# Patient Record
Sex: FEMALE | Race: WHITE | Employment: FULL TIME | URBAN - METROPOLITAN AREA
[De-identification: names, ages, dates, MRNs, and addresses within clinical notes are randomized per-mention and may not be internally consistent; named-entity substitution may affect disease eponyms.]

---

## 2024-06-06 ENCOUNTER — OFFICE VISIT (OUTPATIENT)
Dept: NEUROLOGY | Age: 24
End: 2024-06-06
Payer: COMMERCIAL

## 2024-06-06 DIAGNOSIS — G47.9 SLEEP DISORDER: ICD-10-CM

## 2024-06-06 DIAGNOSIS — R56.9 SEIZURE (HCC): Primary | ICD-10-CM

## 2024-06-06 PROCEDURE — 99204 OFFICE O/P NEW MOD 45 MIN: CPT | Performed by: PSYCHIATRY & NEUROLOGY

## 2024-06-06 RX ORDER — DIAZEPAM 10 MG/1
TABLET ORAL
Qty: 1 TABLET | Refills: 0 | Status: SHIPPED | OUTPATIENT
Start: 2024-06-06 | End: 2024-08-01

## 2024-06-06 RX ORDER — LEVETIRACETAM 500 MG/1
500 TABLET ORAL 2 TIMES DAILY
Qty: 180 TABLET | Refills: 3 | Status: SHIPPED | OUTPATIENT
Start: 2024-06-06

## 2024-06-06 ASSESSMENT — ENCOUNTER SYMPTOMS
EYES NEGATIVE: 1
ALLERGIC/IMMUNOLOGIC NEGATIVE: 1
RESPIRATORY NEGATIVE: 1
GASTROINTESTINAL NEGATIVE: 1

## 2024-06-06 NOTE — PROGRESS NOTES
[facility] NEUROLOGY  [address], [address]  [place] [address]  Phone: [phone] Fax [phone]  Dr. Vladimir Ray      6/6/2024  Mattie Mckeon     Patient is referred by the following provider for consultation regarding as below:       I reviewed the available records and notes and have examined patient with the following findings:     Chief Complaint:  No chief complaint on file.         HPI: This is a right handed 23 y.o.  female here with her .  In 2020 the patient had her first episode where she woke up in the morning her left arm was charley horse René up to the point where it suzan into her body this was again going from sleep to wake and it progressively quickly got worse to the point where she actually arched over the bed and had what sounds like a tonic seizure.  Her mom and finder took to the emergency room the hospital felt like there is a good chance it might be a seizure I referred her to cardiology.  I believe she had EEGs as well workup which was negative.  Her second episode occurred in the next year as her wedding day July 2021 7 in the morning left arm charley horse going from transition of sleep to awake arched rate off the bed.  Went to the emergency room again they said this was maybe another seizure.  In 2022 within the next year she had another episode she did not go to the hospital.  The following year in 2023 she had another identical episode transitioning from sleep to wake went to the emergency room they actually started her on Keppra 500 mg twice a day she saw [facility] who again was not sure but it sounded like seizure activity.  She did well for about 7 months and then they weaned her off of it about 6 months after that she had another event.  She did inform the neurologist that they were not sure what was going they did an 1 hour EEG her primary care did an EEG because a neurologist just was not going to do much at that point.  Then 2 weeks

## 2024-06-10 ENCOUNTER — CLINICAL DOCUMENTATION (OUTPATIENT)
Dept: NEUROLOGY | Age: 24
End: 2024-06-10

## 2024-06-10 NOTE — PROGRESS NOTES
I did receive a great deal of blood studies from this patient that were done on 6-3-2024 her white count is in the normal range.  Her hemoglobin A1c was 5.2 which was normal.  TSH was normal B12 was 423 vitamin D was normal iron was normal her chlamydia test was normal

## 2024-09-17 ENCOUNTER — CLINICAL DOCUMENTATION (OUTPATIENT)
Dept: NEUROLOGY | Age: 24
End: 2024-09-17

## 2024-11-13 ENCOUNTER — TELEMEDICINE (OUTPATIENT)
Dept: NEUROLOGY | Age: 24
End: 2024-11-13
Payer: COMMERCIAL

## 2024-11-13 DIAGNOSIS — R56.9 NOCTURNAL SEIZURES (HCC): Primary | ICD-10-CM

## 2024-11-13 PROCEDURE — 99214 OFFICE O/P EST MOD 30 MIN: CPT | Performed by: PSYCHIATRY & NEUROLOGY

## 2024-11-13 RX ORDER — LEVETIRACETAM 500 MG/1
TABLET ORAL
Qty: 270 TABLET | Refills: 3 | Status: SHIPPED | OUTPATIENT
Start: 2024-11-13

## 2024-11-13 ASSESSMENT — ENCOUNTER SYMPTOMS
ALLERGIC/IMMUNOLOGIC NEGATIVE: 1
EYES NEGATIVE: 1
RESPIRATORY NEGATIVE: 1
GASTROINTESTINAL NEGATIVE: 1

## 2024-11-13 NOTE — PROGRESS NOTES
[facility] NEUROLOGY  [address] [place] [address], [address]  [place] [address]  Phone: [phone] Fax [phone]  Dr. Vladimir Ray      I was in the office While conducting this encounter.    She and/ or her healthcare decision maker is aware that this patient-initialed Telehealth encounter is a billable service with coverage as determined by her insurance carrier.  She is aware that she may receive a bill and has provided verbal consent to proceed: Yes    The Virtual visit was conducted via [employer]. Pursuant to the emergency declaration under the Shay Act and the National Emergencies Act, 1135 waiver authority and the Coronavirus Preparedness and Response Supplemental Appropriations Act, this Virtual  Visit was conducted to reduce the patient's risk of exposure to COVID-19 and provide continuity of care for an established patient. Services were provided through a video synchronous discussion virtually to substitute for in-person clinic visit.  Due to this being a TeleHealth evaluation, many elements of the physical examination are unable to be assessed.     Total Time:  minutes: 21-30 minutes      11/13/2024  Mattie Mckeon     Patient is referred by the following provider for consultation regarding as below:       I reviewed the available records and notes and have examined patient with the following findings:     Chief Complaint:  No chief complaint on file.         HPI: This is a right handed 24 y.o.  female who is very pleasant very appropriate patient in 2020 she had her first episode where her left arm charley horse stop and she did this weird arched back thing that in reality sounds like a tonic seizure.  Then in 2021 she had her second episode again starting the same way left arm cramping up arching of her back.  And again ER thought it was a seizure.  They did not treat her in 2022 she had her third episode in 2023 she had a fourth episode at which time the current Keppra 500 mg twice a day